# Patient Record
Sex: MALE | Race: WHITE
[De-identification: names, ages, dates, MRNs, and addresses within clinical notes are randomized per-mention and may not be internally consistent; named-entity substitution may affect disease eponyms.]

---

## 2017-03-18 ENCOUNTER — HOSPITAL ENCOUNTER (EMERGENCY)
Dept: HOSPITAL 77 - KA.ED | Age: 27
LOS: 1 days | Discharge: HOME | End: 2017-03-19
Payer: COMMERCIAL

## 2017-03-18 VITALS — SYSTOLIC BLOOD PRESSURE: 143 MMHG | DIASTOLIC BLOOD PRESSURE: 69 MMHG

## 2017-03-18 DIAGNOSIS — Z88.8: ICD-10-CM

## 2017-03-18 DIAGNOSIS — Z88.2: ICD-10-CM

## 2017-03-18 DIAGNOSIS — T78.40XA: Primary | ICD-10-CM

## 2017-03-18 DIAGNOSIS — Z88.1: ICD-10-CM

## 2017-03-18 DIAGNOSIS — Z88.0: ICD-10-CM

## 2017-03-18 PROCEDURE — 96374 THER/PROPH/DIAG INJ IV PUSH: CPT

## 2017-03-18 PROCEDURE — 96375 TX/PRO/DX INJ NEW DRUG ADDON: CPT

## 2017-03-18 PROCEDURE — 99283 EMERGENCY DEPT VISIT LOW MDM: CPT

## 2017-03-19 NOTE — EDM.PDOC
ED HPI Allergic Reaction





- General


Chief Complaint: Allergic Reaction


Stated Complaint: Allergic reaction


Time Seen by Provider: 03/18/17 22:30


Source of Information: Reports: Patient





- History of Present Illness


INITIAL COMMENTS - FREE TEXT/NARRATIVE: 





26-year-old male presents to the emergency room this evening with complaints of 

rash, hives over his arms and lower legs. Patient reports rash started over the 

last 48 hours. He's been taking the Benadryl which has helped curb the itching 

but has not improved the rash and has continued to have swelling in his lower 

legs. He denies any rash around his neck or face. He denies any shortness of 

breath or difficulty breathing. He denies throat swelling. He comes in for 

further evaluation as the Benadryl has not seemed to improve his symptoms.


Symptom Onset Date: 03/17/17


Timing/Duration: Reports: Day(s):


Location, Skin: Reports: back, upper extremity, right, upper extremity, left, 

lower extremity, right, lower extremity, left


Characteristics: Reports: maculopapular, patchy, urticarial


Associated features: Reports: swelling


Quality: Reports: Itching


Severity: moderate


Known identified source: no


Place of Occurrence: home


Associated Symptoms: Reports: no other symptoms


Improves with: Reports: Medication


Worsens with: Reports: None


Place of Occurrence: Reports: home


Suspected Etiology: Reports: unknown


Recent Medical Care: no


Treatments PTA: Reports: Other medication(s) (Benadryl)





- Related Data


Allergies/ADRs: 


 Allergies











Allergy/AdvReac Type Severity Reaction Status Date / Time


 


amoxicillin [From Augmentin] Allergy  Rash Verified 03/18/17 22:33


 


clavulanic acid Allergy  Rash Verified 03/18/17 22:33





[From Augmentin]     


 


Penicillins Allergy  Rash Verified 03/18/17 22:33


 


sulfamethoxazole Allergy  Hives Verified 03/18/17 22:33





[From Bactrim]     


 


trimethoprim [From Bactrim] Allergy  Hives Verified 03/18/17 22:33











Home Meds: 


 Home Meds





diphenhydrAMINE [Benadryl] 50 mg PO QID PRN 03/18/17 [History]











ED ROS ALLERGIC REACTION





- Review of Systems


Review Of Systems: ROS reveals no pertinent complaints other than HPI.





ED EXAM GENERAL NO PERIP PULSE





- Physical Exam


Exam: See Below


Exam Limited By: No limitations


General Appearance: alert, WD/WN, no apparent distress


Eye Exam: bilateral eye: EOMI, PERRL


Ears: normal external exam


Nose: normal inspection


Throat/Mouth: Normal inspection, Normal lips, Normal teeth, Normal gums, Normal 

oropharynx, Normal voice, No airway compromise


Head: atraumatic, normocephalic


Neck: normal inspection, supple, non-tender, full range of motion


Respiratory/Chest: no respiratory distress, lungs clear, normal breath sounds, 

no accessory muscle use


Cardiovascular: regular rate, rhythm, no murmur


GI/Abdominal: soft


Back Exam: normal inspection, full range of motion


Extremities: normal range of motion, non-tender, pedal edema (mild edema in the 

lower legs, feet), other (hives over the upper and lower extremities and hands 

and feet)


Neurological: alert, oriented, normal cognition, no motor/sensory deficits


Psychiatric: normal affect, normal mood


Skin Exam: Warm, Dry, Intact, Rash


Lymphatic: no adenopathy





Course





- Vital Signs


Last Recorded V/S: 





 Last Vital Signs











Temp  96.5 F   03/18/17 22:36


 


Pulse  78   03/18/17 22:36


 


Resp  20   03/18/17 22:36


 


BP  143/69 H  03/18/17 22:36


 


Pulse Ox  97   03/18/17 22:36














- Orders/Labs/Meds


Orders: 





 Active Orders 24 hr











 Category Date Time Status


 


 Ranitidine [Zantac] Med  03/18/17 22:36 Active





 150 mg PO DAILY PRN   








 Medication Orders





Ranitidine HCl (Zantac)  150 mg PO DAILY PRN


   PRN Reason: Allergies








Meds: 





Medications











Generic Name Dose Route Start Last Admin





  Trade Name Freq  PRN Reason Stop Dose Admin


 


Ranitidine HCl  150 mg  03/18/17 22:36  





  Zantac  PO   





  DAILY PRN   





  Allergies   














Discontinued Medications














Generic Name Dose Route Start Last Admin





  Trade Name Freq  PRN Reason Stop Dose Admin


 


Diphenhydramine HCl  50 mg  03/18/17 22:34  03/18/17 22:55





  Benadryl  IVPUSH  03/18/17 22:35  50 mg





  ONETIME ONE   Administration


 


Methylprednisolone Sodium Succinate  125 mg  03/18/17 22:35  03/18/17 22:55





  Solu-Medrol  IVPUSH  03/18/17 22:36  125 mg





  ONETIME ONE   Administration


 


Prednisone  Confirm  03/18/17 23:20  03/18/17 23:28





  Prednisone  Administered  03/18/17 23:21  60 mg





  Dose   Administration





  60 mg   





  .ROUTE   





  .STK-MED ONE   


 


Ranitidine HCl  50 mg  03/18/17 22:55  03/18/17 22:53





  Zantac  IV  03/18/17 22:56  50 mg





  ONETIME ONE   Administration














- Re-Assessments/Exams


Free Text/Narrative Re-Assessment/Exam: 





03/19/17 01:06


Patient reports improvement of the itching as well as the highest with the IV 

Benadryl 50 mg and IV methylprednisone 125 mg





Departure





- Departure


Time of Disposition: 00:05


Disposition: Home, Self-Care 01


Condition: good


Clinical Impression: 


 Hives





Allergic reaction


Qualifiers:


 Encounter type: initial encounter Qualified Code(s): T78.40XA - Allergy, 

unspecified, initial encounter





Instructions:  Hives, Prednisone tablets


Forms:  ED Department Discharge


Additional Instructions: 


1. Prednisone taper 60 mg tapering down to 10 mg over 6 days.


2. Benadryl 50 mg every 6 hours.


3. Zantac 150 mg twice a day.


4. Followup with your primary care if itching, hives persist.


5. return to the ER if difficulty breathing, swelling of the throat, swelling 

of the face occurs.








- My Orders


Last 24 Hours: 





My Active Orders





03/18/17 22:36


Ranitidine [Zantac]   150 mg PO DAILY PRN 














- Assessment/Plan


Last 24 Hours: 





My Active Orders





03/18/17 22:36


Ranitidine [Zantac]   150 mg PO DAILY PRN 











Assessment:: 





Allergic reaction


Hives


Plan: 





1. Patient will begin a prednisone taper 60 mg tapering down to 10 mg over 6 

days.


2. Continue with by mouth Benadryl 50 mg every 6 hours.


3. Return to the ER if signs of anaphylaxis occur. Difficulty breathing, 

swelling of the throat, facial or neck swelling, or worsening of the hives of 

the extremities.

## 2019-12-20 NOTE — EDM.PDOC
ED HPI GENERAL MEDICAL PROBLEM





- General


Chief Complaint: General


Stated Complaint: right middle finger injury


Time Seen by Provider: 12/20/19 15:15


Source of Information: Reports: Patient


History Limitations: Reports: No Limitations





- History of Present Illness


INITIAL COMMENTS - FREE TEXT/NARRATIVE: 





28 YO WM presents to ER with an injury to right middle finger tip after 

smashing it on some weighted plates while lifting today. Pt reports injury to 

finger nail which is what prompted his ER visit. Pt denies any other injury. 

Bleeding controlled. Finger and hand are neurovascularly intact without any 

difficulty with movement or sensation. 


Onset: Today


Location: Reports: Upper Extremity, Right


Quality: Reports: Ache


Severity: Moderate


Improves with: Reports: None


Worsens with: Reports: None


Associated Symptoms: Reports: No Other Symptoms


  ** Right Finger-Middle


Pain Score (Numeric/FACES): 5





- Related Data


 Allergies











Allergy/AdvReac Type Severity Reaction Status Date / Time


 


amoxicillin [From Augmentin] Allergy  Rash Verified 12/20/19 15:16


 


clavulanic acid Allergy  Rash Verified 12/20/19 15:16





[From Augmentin]     


 


Penicillins Allergy  Rash Verified 12/20/19 15:16


 


sulfamethoxazole Allergy  Hives Verified 12/20/19 15:16





[From Bactrim]     


 


trimethoprim [From Bactrim] Allergy  Hives Verified 12/20/19 15:16











Home Meds: 


 Home Meds





Doxycycline [Vibramycin] 100 mg PO BID #20 cap 12/20/19 [Rx]


traMADol [Ultram] 50 mg PO Q6H PRN #10 tab 12/20/19 [Rx]











Social & Family History





- Tobacco Use


Smoking Status *Q: Former Smoker


Used Tobacco, but Quit: Yes


Month/Year Tobacco Last Used: quit jan 2019





- Caffeine Use


Caffeine Use: Reports: Coffee, Soda


Caffeine Use Comment: did not ask





- Recreational Drug Use


Recreational Drug Use: No





ED ROS GENERAL





- Review of Systems


Review Of Systems: See Below


Constitutional: Reports: No Symptoms


HEENT: Reports: No Symptoms


Respiratory: Reports: No Symptoms


Cardiovascular: Reports: No Symptoms


Endocrine: Reports: No Symptoms


GI/Abdominal: Reports: No Symptoms


: Reports: No Symptoms


Musculoskeletal: Reports: Hand Pain (right middle finger injury)


Skin: Reports: No Symptoms


Neurological: Reports: No Symptoms


Psychiatric: Reports: No Symptoms


Hematologic/Lymphatic: Reports: No Symptoms


Immunologic: Reports: No Symptoms





ED EXAM, GENERAL





- Physical Exam


Exam: See Below


Exam Limited By: No Limitations


General Appearance: Alert, WD/WN, No Apparent Distress


Head: Atraumatic, Normocephalic


Neck: Normal Inspection, Supple, Non-Tender, Full Range of Motion


Respiratory/Chest: No Respiratory Distress, Lungs Clear, Normal Breath Sounds, 

No Accessory Muscle Use, Chest Non-Tender


Cardiovascular: Normal Peripheral Pulses, Regular Rate, Rhythm, No Edema, No 

Gallop, No JVD, No Murmur, No Rub


GI/Abdominal: Normal Bowel Sounds, Soft, Non-Tender, No Organomegaly, No 

Distention, No Abnormal Bruit, No Mass


Back Exam: Normal Inspection, Full Range of Motion, NT


Extremities: Normal Inspection, Normal Range of Motion, No Pedal Edema, Normal 

Capillary Refill


Neurological: Alert, Oriented, CN II-XII Intact, Normal Cognition, Normal Gait, 

Normal Reflexes, No Motor/Sensory Deficits


Psychiatric: Normal Affect, Normal Mood


Skin Exam: Warm, Dry, Intact, Normal Color, No Rash, Wound/Incision (complete 

nail avulsion to right middle finger without laceration )


Lymphatic: No Adenopathy





ED GENERAL MEDICAL PROCEDURES





- Laceration/Wound Repair


  ** Right Distal Digit - 3rd (Middle)


Appearance: Other (nail removal )


Distal NVT: Neuro & Vascular Intact, No Tendon Injury


Anesthetic Type: Digital


Local Anesthesia - Lidocaine (Xylocaine): 1% Plain


Local Anesthetic Volume: Other (10)


Skin Prep: Chlorhexidine (Hibiciens), Saline


Exploration/Debridement/Repair: Wound Explored, Other (nail removal from 

partially avulsed fingernail)


Sterile Dressing Applied: Provider


Tetanus Status Addressed: Yes


Complications: No





Course





- Vital Signs


Last Recorded V/S: 


 Last Vital Signs











Temp  35.9 C   12/20/19 15:07


 


Pulse  88   12/20/19 15:07


 


Resp  18   12/20/19 15:07


 


BP  142/77 H  12/20/19 15:07


 


Pulse Ox  95   12/20/19 15:07














- Orders/Labs/Meds


Orders: 


 Active Orders 24 hr











 Category Date Time Status


 


 Vaccines to be Administered [RC] PER UNIT ROUTINE Care  12/20/19 15:30 Ordered











Meds: 


Medications














Discontinued Medications














Generic Name Dose Route Start Last Admin





  Trade Name Freq  PRN Reason Stop Dose Admin


 


Diphtheria/Tetanus/Acell Pertussis  0.5 ml  12/20/19 15:29  12/20/19 15:49





  Adacel  IM  12/20/19 15:30  Not Given





  .ONCE ONE   





     





     





     





     


 


Lidocaine HCl  10 ml  12/20/19 15:29  12/20/19 15:50





  Xylocaine 1%  INJECT  12/20/19 15:30  Not Given





  ONETIME ONE   





     





     





     





     


 


Lidocaine HCl  10 ml  12/20/19 15:35  12/20/19 15:50





  Xylocaine 1%  INJECT  12/20/19 15:36  Not Given





  ONETIME ONE   





     





     





     





     


 


Lidocaine HCl  20 ml  12/20/19 15:41  12/20/19 15:49





  Xylocaine 1%  INJECT  12/20/19 15:42  10 ml





  ONETIME ONE   Administration





     





     





     





     














- Radiology Interpretation


Free Text/Narrative:: 





right middle finger- comminuted distal 3rd digit fracture





Departure





- Departure


Time of Disposition: 15:43


Disposition: Home, Self-Care 01


Condition: Good


Clinical Impression: 


 Open fracture of distal phalanx of digit of right hand





Fingernail avulsion, complete


Qualifiers:


 Encounter type: initial encounter Qualified Code(s): S61.309A - Unspecified 

open wound of unspecified finger with damage to nail, initial encounter








- Discharge Information


Prescriptions: 


Doxycycline [Vibramycin] 100 mg PO BID #20 cap


traMADol [Ultram] 50 mg PO Q6H PRN #10 tab


 PRN Reason: Pain


Instructions:  Nail Avulsion, Finger Fracture, Adult, Fingernail or Toenail 

Removal, Adult, Care After


Referrals: 


Sherrell Kaur MD [Primary Care Provider] - 


Forms:  ED Department Discharge


Additional Instructions: 


1. discharge home


2. doxycycline 100mg PO BID x 10 days for open fracture


3. ultram 50mg PO Q6 PRN pain #10


4. wound care instructions given


5. follow up in clinic in 1 week for recheck


6. return to ER for worsening symptoms





Sepsis Event Note





- Evaluation


Sepsis Screening Result: No Definite Risk





- Focused Exam


Vital Signs: 


 Vital Signs











  Temp Pulse Resp BP Pulse Ox


 


 12/20/19 15:07  35.9 C  88  18  142/77 H  95











Date Exam was Performed: 12/20/19


Time Exam was Performed: 16:10





- My Orders


Last 24 Hours: 


My Active Orders





12/20/19 15:30


Vaccines to be Administered [RC] PER UNIT ROUTINE 














- Assessment/Plan


Last 24 Hours: 


My Active Orders





12/20/19 15:30


Vaccines to be Administered [RC] PER UNIT ROUTINE 











Assessment:: 





1. right distal 3rd digit comminuted fracture with nail avulsion 


Plan: 





1. discharge home


2. doxycycline 100mg PO BID x 10 days for open fracture


3. ultram 50mg PO Q6 PRN pain #10


4. wound care instructions given


5. follow up in clinic in 1 week for recheck


6. return to ER for worsening symptoms

## 2019-12-20 NOTE — CR
______________________________________________________________________________   

  

[id number] RAD/RAD Fingers Right  

EXAM: RIGHT FINGERS 3 VIEWS  

   

 INDICATION: INJURY WITH WEIGHTS.  

   

 COMPARISON: None.  

   

 DISCUSSION: There is a tiny chip fracture off the tip of the distal third  

 phalanx. There is an adjacent soft tissue injury with soft tissue gas. No  

 dislocation or other osseous abnormality.  

   

 IMPRESSION:  

 1.  Small chip fracture off the terminal tuft of the distal third phalanx.  

   

 Electronically signed by Roque Lopez MD on 12/20/2019 3:51 PM  

   

  

Roque Lopez MD                 

 12/20/19 1553    

  

Thank you for allowing us to participate in the care of your patient.

## 2020-10-24 NOTE — EDM.PDOC
ED HPI GENERAL MEDICAL PROBLEM





- General


Chief Complaint: Lower Extremity Injury/Pain


Stated Complaint: RIGHT FOOT INJURY


Time Seen by Provider: 10/24/20 16:29


Source of Information: Reports: Patient


History Limitations: Reports: No Limitations





- History of Present Illness


INITIAL COMMENTS - FREE TEXT/NARRATIVE: 





Patient presents with right foot pain after twisting it on some stairs today.  

He can walk on it but it's painful.  He denies any other injuries.


  ** Right Ankle


Pain Score (Numeric/FACES): 5





- Related Data


                                    Allergies











Allergy/AdvReac Type Severity Reaction Status Date / Time


 


amoxicillin [From Augmentin] Allergy  Rash Verified 12/20/19 15:16


 


clavulanic acid Allergy  Rash Verified 12/20/19 15:16





[From Augmentin]     


 


Penicillins Allergy  Rash Verified 12/20/19 15:16


 


sulfamethoxazole Allergy  Hives Verified 12/20/19 15:16





[From Bactrim]     


 


trimethoprim [From Bactrim] Allergy  Hives Verified 12/20/19 15:16











Home Meds: 


                                    Home Meds





Doxycycline [Vibramycin] 100 mg PO BID #20 cap 12/20/19 [Rx]


traMADol [Ultram] 50 mg PO Q6H PRN #10 tab 12/20/19 [Rx]











Past Medical History





- Past Surgical History


HEENT Surgical History: Reports: Tonsillectomy





Social & Family History





- Tobacco Use


Tobacco Use Status *Q: Never Tobacco User





- Caffeine Use


Caffeine Use: Reports: Coffee, Soda


Caffeine Use Comment: did not ask





- Recreational Drug Use


Recreational Drug Use: No





Review of Systems





- Review of Systems


Review Of Systems: Comprehensive ROS is negative, except as noted in HPI.





ED EXAM, GENERAL





- Physical Exam


Exam: See Below


Exam Limited By: No Limitations


General Appearance: Alert, WD/WN, No Apparent Distress


Eye Exam: Bilateral Eye: EOMI, Normal Inspection, PERRL


Ears: Normal External Exam, Hearing Grossly Normal


Nose: Normal Inspection, No Blood


Throat/Mouth: Normal Inspection, Normal Voice, No Airway Compromise


Head: Atraumatic, Normocephalic


Neck: Normal Inspection, Full Range of Motion


Respiratory/Chest: No Respiratory Distress, No Accessory Muscle Use


Extremities: Other (Right foot has swelling in the proximal 3rd and 4th met

atarsals to the anterior lateral malleolus.  This region is also tender to 

palpation but without ecchymosis.  No pain in malleoli or other metatarsals.  

Distal CMS intact.)


Neurological: Alert, Oriented, Normal Cognition, No Motor/Sensory Deficits


Psychiatric: Normal Affect, Normal Mood


Skin Exam: Warm, Dry, Intact, Normal Color, No Rash





Course





- Vital Signs


Last Recorded V/S: 





                                Last Vital Signs











Temp  99.0 F   10/24/20 16:04


 


Pulse  66   10/24/20 16:04


 


Resp  18   10/24/20 16:04


 


BP  119/67   10/24/20 16:04


 


Pulse Ox  97   10/24/20 16:04














- Orders/Labs/Meds


Orders: 





                               Active Orders 24 hr











 Category Date Time Status


 


 Ankle 2V Rt [CR] Stat Exams  10/24/20 16:03 Ordered


 


 Foot 2V Rt [CR] Stat Exams  10/24/20 16:03 Ordered














- Re-Assessments/Exams


Free Text/Narrative Re-Assessment/Exam: 





10/24/20 17:04


Xrays show no acute fracture but possibly an old talus fracture vs ossicle 

variant.  Discussed findings and treatment plan with patient.  He is fitted with

 a hard-soled shoe and ACE wrap is placed.  This provided significant in walking

 comfort.  We discussed CAM boot also and pt would like to just do the shoe.  

Discharged to home in stable condition.





Departure





- Departure


Time of Disposition: 16:54


Disposition: Home, Self-Care 01


Condition: Good


Clinical Impression: 


Sprain of foot, right


Qualifiers:


 Encounter type: initial encounter Qualified Code(s): S93.601A - Unspecified 

sprain of right foot, initial encounter








- Discharge Information


Referrals: 


Sherrell Kaur MD [Primary Care Provider] - 


Additional Instructions: 


Use the CAM boot and ACE wrap to control pain and swelling as needed.





Use ice and elevating the foot above heart level as needed to control swelling 

as well.





You can use Ibuprofen and/or tylenol as directed if needed for pain control.





Follow up with PCP if not improving in a week or sooner if worsening.





Sepsis Event Note (ED)





- Evaluation


Sepsis Screening Result: No Definite Risk





- Focused Exam


Vital Signs: 





                                   Vital Signs











  Temp Pulse Resp BP Pulse Ox


 


 10/24/20 16:04  99.0 F  66  18  119/67  97














- My Orders


Last 24 Hours: 





My Active Orders





10/24/20 16:03


Ankle 2V Rt [CR] Stat 


Foot 2V Rt [CR] Stat 














- Assessment/Plan


Last 24 Hours: 





My Active Orders





10/24/20 16:03


Ankle 2V Rt [CR] Stat 


Foot 2V Rt [CR] Stat

## 2020-10-24 NOTE — CR
______________________________________________________________________________   

  

[id number] RAD/RAD Ankle Right 2V  

Exam: RAD Ankle Right 2V  

   

 Indication:FALL.  

   

 Comparison: No prior imaging for comparison.  

   

 Discussion/Impression:   

   

 AP view demonstrates small acute appearing avulsion-type fracture fragments  

 arising from the lateral hindfoot. Difficult to accurately localize on the other  

 views as they're not visualized. There is overlying soft tissue swelling.  

   

 No other evidence of fracture in the hindfoot. No dislocation.  

   

 Electronically signed by Eleazar tSeiner MD on 10/24/2020 4:42 PM  

   

  

Eleazar Steiner MD                 

 10/24/20 0699    

  

Thank you for allowing us to participate in the care of your patient.

## 2020-10-24 NOTE — CR
______________________________________________________________________________   

  

[id number] RAD/RAD Foot Right 2V  

Exam: RAD Foot Right 2V  

   

 Indication:FALL.  

   

 Comparison: No prior imaging for comparison.  

   

 Discussion/Impression:   

   

 Accessory ossicle versus chronically ununited fracture, or talar ridge (normal  

 variant) on the dorsal aspect of the talus on lateral view.  

   

 No evidence of an acute fracture in the foot. Bones in normal alignment. No AVN  

 or erosive changes identified.  

   

 Electronically signed by Eleazar Steiner MD on 10/24/2020 4:41 PM  

   

  

Eleazar Steiner MD                 

 10/24/20 [id number]    

  

Thank you for allowing us to participate in the care of your patient.